# Patient Record
Sex: MALE | Race: WHITE | NOT HISPANIC OR LATINO | Employment: FULL TIME | URBAN - METROPOLITAN AREA
[De-identification: names, ages, dates, MRNs, and addresses within clinical notes are randomized per-mention and may not be internally consistent; named-entity substitution may affect disease eponyms.]

---

## 2023-04-11 ENCOUNTER — OFFICE VISIT (OUTPATIENT)
Dept: PRIMARY CARE | Facility: CLINIC | Age: 53
End: 2023-04-11
Payer: COMMERCIAL

## 2023-04-11 VITALS
SYSTOLIC BLOOD PRESSURE: 128 MMHG | OXYGEN SATURATION: 94 % | DIASTOLIC BLOOD PRESSURE: 88 MMHG | HEIGHT: 75 IN | BODY MASS INDEX: 31.83 KG/M2 | TEMPERATURE: 98.7 F | HEART RATE: 96 BPM | RESPIRATION RATE: 18 BRPM | WEIGHT: 256 LBS

## 2023-04-11 DIAGNOSIS — F17.210 CIGARETTE NICOTINE DEPENDENCE WITHOUT COMPLICATION: Primary | ICD-10-CM

## 2023-04-11 DIAGNOSIS — E66.9 CLASS 1 OBESITY WITH BODY MASS INDEX (BMI) OF 32.0 TO 32.9 IN ADULT, UNSPECIFIED OBESITY TYPE, UNSPECIFIED WHETHER SERIOUS COMORBIDITY PRESENT: ICD-10-CM

## 2023-04-11 DIAGNOSIS — G47.33 OSA (OBSTRUCTIVE SLEEP APNEA): ICD-10-CM

## 2023-04-11 DIAGNOSIS — Z12.2 ENCOUNTER FOR SCREENING FOR LUNG CANCER: ICD-10-CM

## 2023-04-11 PROCEDURE — 3008F BODY MASS INDEX DOCD: CPT | Performed by: STUDENT IN AN ORGANIZED HEALTH CARE EDUCATION/TRAINING PROGRAM

## 2023-04-11 PROCEDURE — 4004F PT TOBACCO SCREEN RCVD TLK: CPT | Performed by: STUDENT IN AN ORGANIZED HEALTH CARE EDUCATION/TRAINING PROGRAM

## 2023-04-11 PROCEDURE — 99204 OFFICE O/P NEW MOD 45 MIN: CPT | Performed by: STUDENT IN AN ORGANIZED HEALTH CARE EDUCATION/TRAINING PROGRAM

## 2023-04-11 RX ORDER — IBUPROFEN 200 MG
1 TABLET ORAL EVERY 24 HOURS
Qty: 28 PATCH | Refills: 1 | Status: SHIPPED | OUTPATIENT
Start: 2023-04-11 | End: 2023-06-06

## 2023-04-11 ASSESSMENT — ENCOUNTER SYMPTOMS
OCCASIONAL FEELINGS OF UNSTEADINESS: 0
AGITATION: 0
SPEECH DIFFICULTY: 0
TROUBLE SWALLOWING: 0
NUMBNESS: 0
NECK STIFFNESS: 0
CONFUSION: 0
WHEEZING: 0
WEAKNESS: 0
DEPRESSION: 0
DIZZINESS: 0
SHORTNESS OF BREATH: 0
LIGHT-HEADEDNESS: 0
FEVER: 0
UNEXPECTED WEIGHT CHANGE: 0
BRUISES/BLEEDS EASILY: 0
HEADACHES: 0
ADENOPATHY: 0
COUGH: 0
FATIGUE: 1
NECK PAIN: 0
CHILLS: 0
DIAPHORESIS: 0
PALPITATIONS: 0
LOSS OF SENSATION IN FEET: 0
COLOR CHANGE: 0

## 2023-04-11 NOTE — PROGRESS NOTES
Subjective   Patient ID: Derrick Duncan is a 52 y.o. male who presents for Sleep Apnea (Pt here to request a referral).    Patient presenting for concern for sleep apnea.  He has not been to his PCP in several years.  States he was previously diagnosed with CHRIS and prescribed CPAP which he used regularly and did have improvement on this device.  States he lost significant amount of weight and CHRIS improved, therefore he stopped using CPAP a few years ago.  States he began gaining weight again, has had difficulty staying asleep, and occasionally wakes up feeling short of breath.  Patient requesting new sleep study and prescription for CPAP if appropriate.  States he has not exercising regularly or following any specific dietary regimen.  Patient does smoke cigarettes daily, roughly 1 pack daily with roughly 25-pack-year history.  He is interested in quitting and states nicotine patches helped in the past and he was successful in quitting smoking for roughly 5 years up until roughly 5 years ago. Denies any excessive alcohol use or recreational drug use. Patient states he does not take any medications regularly.         Review of Systems   Constitutional:  Positive for fatigue. Negative for chills, diaphoresis, fever and unexpected weight change.   HENT:  Negative for trouble swallowing.    Eyes:  Negative for visual disturbance.   Respiratory:  Negative for cough, shortness of breath and wheezing.    Cardiovascular:  Negative for chest pain, palpitations and leg swelling.   Musculoskeletal:  Negative for neck pain and neck stiffness.   Skin:  Negative for color change and rash.   Allergic/Immunologic: Negative for environmental allergies, food allergies and immunocompromised state.   Neurological:  Negative for dizziness, speech difficulty, weakness, light-headedness, numbness and headaches.   Hematological:  Negative for adenopathy. Does not bruise/bleed easily.   Psychiatric/Behavioral:  Negative for agitation,  "behavioral problems and confusion.    All other systems reviewed and are negative.      Objective   /88 (BP Location: Right arm, Patient Position: Sitting)   Pulse 96   Temp 37.1 °C (98.7 °F) (Temporal)   Resp 18   Ht 1.905 m (6' 3\")   Wt 116 kg (256 lb)   SpO2 94%   BMI 32.00 kg/m²     Physical Exam  Vitals and nursing note reviewed.   Constitutional:       General: He is not in acute distress.     Appearance: Normal appearance. He is obese. He is not toxic-appearing.   HENT:      Head: Normocephalic and atraumatic.      Right Ear: External ear normal.      Left Ear: External ear normal.      Nose: Nose normal.      Mouth/Throat:      Mouth: Mucous membranes are moist.      Pharynx: Oropharynx is clear.      Comments: Mallampati score 3.  Eyes:      Extraocular Movements: Extraocular movements intact.      Conjunctiva/sclera: Conjunctivae normal.      Pupils: Pupils are equal, round, and reactive to light.   Cardiovascular:      Rate and Rhythm: Normal rate and regular rhythm.      Pulses: Normal pulses.      Heart sounds: Normal heart sounds. No murmur heard.  Pulmonary:      Effort: Pulmonary effort is normal. No respiratory distress.      Breath sounds: Normal breath sounds. No stridor. No wheezing or rhonchi.   Abdominal:      General: Abdomen is flat. There is no distension.      Palpations: Abdomen is soft.   Musculoskeletal:         General: Normal range of motion.      Cervical back: Normal range of motion and neck supple. No tenderness.      Right lower leg: No edema.      Left lower leg: No edema.   Lymphadenopathy:      Cervical: No cervical adenopathy.   Skin:     General: Skin is warm and dry.      Capillary Refill: Capillary refill takes less than 2 seconds.      Coloration: Skin is not jaundiced.      Findings: No rash.   Neurological:      General: No focal deficit present.      Mental Status: He is alert and oriented to person, place, and time.      Cranial Nerves: No cranial nerve " deficit.      Sensory: No sensory deficit.      Motor: No weakness.   Psychiatric:         Mood and Affect: Mood normal.         Behavior: Behavior normal.         Thought Content: Thought content normal.         Judgment: Judgment normal.         Assessment/Plan   Problem List Items Addressed This Visit    None  Visit Diagnoses       Cigarette nicotine dependence without complication    -  Primary    Relevant Medications    nicotine (Nicoderm CQ) 14 mg/24 hr patch    Other Relevant Orders    CT lung screening low dose    CHRIS (obstructive sleep apnea)        Relevant Orders    In-Center Sleep Study (Non-Sleep Provider Only)    Class 1 obesity with body mass index (BMI) of 32.0 to 32.9 in adult, unspecified obesity type, unspecified whether serious comorbidity present        Encounter for screening for lung cancer              Given history of CHRIS, diagnosis of obesity, and symptoms, will order sleep study and follow-up when resulted.  Blood pressure well controlled at this time.  Discussed importance of healthy dietary habits including reduced salt intake and reduced carbohydrates and trans/saturated fat.  Also discussed importance of regular cardiovascular exercise and staying well-hydrated.  Patient does have 25-pack-year history with cigarettes, and is interested in quitting.  States nicotine patches helped in the past.  Will prescribe daily nicotine patch at this time.  Given smoking history and age, will also order low-dose CT for lung cancer screening.  We will follow-up when resulted.  Plan for follow-up in 1 month for annual health maintenance exam.  Patient may follow-up sooner as needed.

## 2023-04-12 NOTE — PROGRESS NOTES
I saw and evaluated the patient. I personally obtained the key and critical portions of the history and physical exam or was physically present for key and critical portions performed by the resident/fellow. I reviewed the resident/fellow's documentation and discussed the patient with the resident/fellow. I agree with the resident/fellow's medical decision making as documented in the note.    Ehsan Cullen, DO

## 2023-04-20 DIAGNOSIS — G47.33 OSA (OBSTRUCTIVE SLEEP APNEA): Primary | ICD-10-CM

## 2023-04-20 DIAGNOSIS — E04.1 THYROID NODULE: ICD-10-CM

## 2023-04-24 ENCOUNTER — TELEPHONE (OUTPATIENT)
Dept: PRIMARY CARE | Facility: CLINIC | Age: 53
End: 2023-04-24
Payer: COMMERCIAL

## 2023-04-24 NOTE — TELEPHONE ENCOUNTER
Called patient to discuss sleep study results. CPAP ordered based on results. Will await return call.

## 2023-04-25 ENCOUNTER — TELEPHONE (OUTPATIENT)
Dept: PRIMARY CARE | Facility: CLINIC | Age: 53
End: 2023-04-25
Payer: COMMERCIAL

## 2023-04-27 NOTE — TELEPHONE ENCOUNTER
Called and spoke with patient regarding his results. Thyroid US and TSH w/ reflex T4 ordered. Will follow-up when results available. Regarding CHRIS, patient also was unable to get CPAP through [facility], therefore CPAP sent to [facility]. Discussed this with patient and contact info for [facility] provided.

## 2023-05-09 ENCOUNTER — TELEPHONE (OUTPATIENT)
Dept: PRIMARY CARE | Facility: CLINIC | Age: 53
End: 2023-05-09
Payer: COMMERCIAL

## 2023-05-19 ENCOUNTER — TELEPHONE (OUTPATIENT)
Dept: PRIMARY CARE | Facility: CLINIC | Age: 53
End: 2023-05-19
Payer: COMMERCIAL

## 2023-05-19 DIAGNOSIS — E04.1 THYROID NODULE: Primary | ICD-10-CM

## 2023-05-19 NOTE — TELEPHONE ENCOUNTER
Contacted patient regarding results of US thyroid. Will plan for patient to follow-up with endocrinology to discuss possible FNA. Will plan to follow-up with patient after he has been seen by endocrinology. Discussed plan with patient and he is understanding and agreeable.

## 2023-08-24 LAB
ALBUMIN (G/DL) IN SER/PLAS: 4.4 G/DL (ref 3.4–5)
ANION GAP IN SER/PLAS: 14 MMOL/L (ref 10–20)
CALCIUM (MG/DL) IN SER/PLAS: 9.7 MG/DL (ref 8.6–10.6)
CARBON DIOXIDE, TOTAL (MMOL/L) IN SER/PLAS: 24 MMOL/L (ref 21–32)
CHLORIDE (MMOL/L) IN SER/PLAS: 107 MMOL/L (ref 98–107)
CREATININE (MG/DL) IN SER/PLAS: 0.77 MG/DL (ref 0.5–1.3)
GFR MALE: >90 ML/MIN/1.73M2
GLUCOSE (MG/DL) IN SER/PLAS: 90 MG/DL (ref 74–99)
PHOSPHATE (MG/DL) IN SER/PLAS: 3.6 MG/DL (ref 2.5–4.9)
POTASSIUM (MMOL/L) IN SER/PLAS: 4.6 MMOL/L (ref 3.5–5.3)
SODIUM (MMOL/L) IN SER/PLAS: 140 MMOL/L (ref 136–145)
UREA NITROGEN (MG/DL) IN SER/PLAS: 15 MG/DL (ref 6–23)

## 2023-08-25 LAB
CALCIDIOL (25 OH VITAMIN D3) (NG/ML) IN SER/PLAS: 50 NG/ML
THYROTROPIN (MIU/L) IN SER/PLAS BY DETECTION LIMIT <= 0.05 MIU/L: 0.63 MIU/L (ref 0.44–3.98)
THYROXINE (T4) FREE (NG/DL) IN SER/PLAS: 1.06 NG/DL (ref 0.78–1.48)

## 2023-08-28 LAB
THYROGLOBULIN AB (IU/ML) IN SER/PLAS: 1.1 IU/ML (ref 0–4)
THYROGLOBULIN LC-MS/MS: NORMAL NG/ML (ref 1.3–31.8)
THYROGLOBULIN: 20.4 NG/ML (ref 1.3–31.8)

## 2023-09-13 ENCOUNTER — HOSPITAL ENCOUNTER (OUTPATIENT)
Dept: DATA CONVERSION | Facility: HOSPITAL | Age: 53
End: 2023-09-13
Attending: RADIOLOGY
Payer: COMMERCIAL

## 2023-09-13 DIAGNOSIS — E04.1 NONTOXIC SINGLE THYROID NODULE: ICD-10-CM

## 2023-09-13 LAB
ALANINE AMINOTRANSFERASE (SGPT) (U/L) IN SER/PLAS: 21 U/L (ref 10–52)
ALBUMIN (G/DL) IN SER/PLAS: 4.3 G/DL (ref 3.4–5)
ALKALINE PHOSPHATASE (U/L) IN SER/PLAS: 60 U/L (ref 33–120)
ANION GAP IN SER/PLAS: 11 MMOL/L (ref 10–20)
ASPARTATE AMINOTRANSFERASE (SGOT) (U/L) IN SER/PLAS: 15 U/L (ref 9–39)
BASOPHILS (10*3/UL) IN BLOOD BY AUTOMATED COUNT: 0.06 X10E9/L (ref 0–0.1)
BASOPHILS/100 LEUKOCYTES IN BLOOD BY AUTOMATED COUNT: 0.9 % (ref 0–2)
BILIRUBIN TOTAL (MG/DL) IN SER/PLAS: 0.4 MG/DL (ref 0–1.2)
CALCIUM (MG/DL) IN SER/PLAS: 9.5 MG/DL (ref 8.6–10.3)
CARBON DIOXIDE, TOTAL (MMOL/L) IN SER/PLAS: 28 MMOL/L (ref 21–32)
CHLORIDE (MMOL/L) IN SER/PLAS: 103 MMOL/L (ref 98–107)
CHOLESTEROL (MG/DL) IN SER/PLAS: 188 MG/DL (ref 0–199)
CHOLESTEROL IN HDL (MG/DL) IN SER/PLAS: 42.1 MG/DL
CHOLESTEROL/HDL RATIO: 4.5
CREATININE (MG/DL) IN SER/PLAS: 0.87 MG/DL (ref 0.5–1.3)
EOSINOPHILS (10*3/UL) IN BLOOD BY AUTOMATED COUNT: 0.35 X10E9/L (ref 0–0.7)
EOSINOPHILS/100 LEUKOCYTES IN BLOOD BY AUTOMATED COUNT: 5.4 % (ref 0–6)
ERYTHROCYTE DISTRIBUTION WIDTH (RATIO) BY AUTOMATED COUNT: 12.8 % (ref 11.5–14.5)
ERYTHROCYTE MEAN CORPUSCULAR HEMOGLOBIN CONCENTRATION (G/DL) BY AUTOMATED: 32.8 G/DL (ref 32–36)
ERYTHROCYTE MEAN CORPUSCULAR VOLUME (FL) BY AUTOMATED COUNT: 86 FL (ref 80–100)
ERYTHROCYTES (10*6/UL) IN BLOOD BY AUTOMATED COUNT: 4.79 X10E12/L (ref 4.5–5.9)
ESTIMATED AVERAGE GLUCOSE FOR HBA1C: 120 MG/DL
GFR MALE: >90 ML/MIN/1.73M2
GLUCOSE (MG/DL) IN SER/PLAS: 89 MG/DL (ref 74–99)
HEMATOCRIT (%) IN BLOOD BY AUTOMATED COUNT: 41.2 % (ref 41–52)
HEMOGLOBIN (G/DL) IN BLOOD: 13.5 G/DL (ref 13.5–17.5)
HEMOGLOBIN A1C/HEMOGLOBIN TOTAL IN BLOOD: 5.8 %
IMMATURE GRANULOCYTES/100 LEUKOCYTES IN BLOOD BY AUTOMATED COUNT: 0.5 % (ref 0–0.9)
LDL: 128 MG/DL (ref 0–99)
LEUKOCYTES (10*3/UL) IN BLOOD BY AUTOMATED COUNT: 6.4 X10E9/L (ref 4.4–11.3)
LYMPHOCYTES (10*3/UL) IN BLOOD BY AUTOMATED COUNT: 2.25 X10E9/L (ref 1.2–4.8)
LYMPHOCYTES/100 LEUKOCYTES IN BLOOD BY AUTOMATED COUNT: 35 % (ref 13–44)
MONOCYTES (10*3/UL) IN BLOOD BY AUTOMATED COUNT: 0.51 X10E9/L (ref 0.1–1)
MONOCYTES/100 LEUKOCYTES IN BLOOD BY AUTOMATED COUNT: 7.9 % (ref 2–10)
NEUTROPHILS (10*3/UL) IN BLOOD BY AUTOMATED COUNT: 3.23 X10E9/L (ref 1.2–7.7)
NEUTROPHILS/100 LEUKOCYTES IN BLOOD BY AUTOMATED COUNT: 50.3 % (ref 40–80)
NRBC (PER 100 WBCS) BY AUTOMATED COUNT: 0 /100 WBC (ref 0–0)
PLATELETS (10*3/UL) IN BLOOD AUTOMATED COUNT: 269 X10E9/L (ref 150–450)
POTASSIUM (MMOL/L) IN SER/PLAS: 4 MMOL/L (ref 3.5–5.3)
PROTEIN TOTAL: 7.2 G/DL (ref 6.4–8.2)
SODIUM (MMOL/L) IN SER/PLAS: 138 MMOL/L (ref 136–145)
TRIGLYCERIDE (MG/DL) IN SER/PLAS: 91 MG/DL (ref 0–149)
UREA NITROGEN (MG/DL) IN SER/PLAS: 15 MG/DL (ref 6–23)
VLDL: 18 MG/DL (ref 0–40)

## 2023-09-14 LAB
COMPLETE PATHOLOGY REPORT: NORMAL
CONVERTED CLINICAL DIAGNOSIS-HISTORY: NORMAL
CONVERTED DIAGNOSIS COMMENT: NORMAL
CONVERTED FINAL DIAGNOSIS: NORMAL
CONVERTED FINAL REPORT PDF LINK TO COPY AND PASTE: NORMAL
CONVERTED SPECIMEN DESCRIPTION: NORMAL

## 2023-10-03 ENCOUNTER — OFFICE VISIT (OUTPATIENT)
Dept: PRIMARY CARE | Facility: CLINIC | Age: 53
End: 2023-10-03
Payer: COMMERCIAL

## 2023-10-03 VITALS
WEIGHT: 254 LBS | TEMPERATURE: 98.7 F | SYSTOLIC BLOOD PRESSURE: 150 MMHG | HEART RATE: 86 BPM | OXYGEN SATURATION: 96 % | RESPIRATION RATE: 20 BRPM | HEIGHT: 75 IN | BODY MASS INDEX: 31.58 KG/M2 | DIASTOLIC BLOOD PRESSURE: 91 MMHG

## 2023-10-03 DIAGNOSIS — E07.9 THYROID LESION: ICD-10-CM

## 2023-10-03 DIAGNOSIS — N28.89 RENAL MASS: Primary | ICD-10-CM

## 2023-10-03 PROCEDURE — 4004F PT TOBACCO SCREEN RCVD TLK: CPT

## 2023-10-03 PROCEDURE — 3008F BODY MASS INDEX DOCD: CPT

## 2023-10-03 PROCEDURE — 99214 OFFICE O/P EST MOD 30 MIN: CPT

## 2023-10-03 RX ORDER — DULOXETIN HYDROCHLORIDE 60 MG/1
60 CAPSULE, DELAYED RELEASE ORAL DAILY
COMMUNITY
Start: 2023-09-25

## 2023-10-03 RX ORDER — ARIPIPRAZOLE 10 MG/1
10 TABLET ORAL DAILY
COMMUNITY
Start: 2023-09-25

## 2023-10-03 ASSESSMENT — ENCOUNTER SYMPTOMS
VOMITING: 0
NAUSEA: 0
LIGHT-HEADEDNESS: 0
FEVER: 0
SHORTNESS OF BREATH: 0
DIZZINESS: 0

## 2023-10-03 NOTE — ASSESSMENT & PLAN NOTE
Renal mass identified on CTPE in ER. Pt remains asymptomatic and unknown if familial history. Pt to undergo MRI with/without contrast for further exploration of renal lesion. Pt will be updated with results.

## 2023-10-03 NOTE — PROGRESS NOTES
Subjective   Derrick Duncan is a 52 y.o. male who presents for Follow-up ([facility] ER follow up. Review CT scan. He was told her has a mass on his lung and needs further testing.).  Pt presenting for hospital follow up for palpitations. Workup in ER was unremarkable for an acute cause and he believes it was due to his anxiety. He was anxious at the time waiting for his thyroid ultrasound w/ biopsy results. He has not had palpitations since and his results came back for a goiter. 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 No symptoms at this time.    BP repeat at 144/90, pt will call back with home readings in one week    Claims father had something for kidneys but he can't remember what        Review of Systems   Constitutional:  Negative for fever.   Respiratory:  Negative for shortness of breath.    Cardiovascular:  Negative for chest pain.   Gastrointestinal:  Negative for nausea and vomiting.   Neurological:  Negative for dizziness and light-headedness.   All other systems reviewed and are negative.      Objective   Physical Exam  Vitals reviewed.   Constitutional:       General: He is not in acute distress.     Appearance: Normal appearance. He is not toxic-appearing.   HENT:      Head: Normocephalic and atraumatic.      Nose: Nose normal.   Eyes:      Extraocular Movements: Extraocular movements intact.   Cardiovascular:      Rate and Rhythm: Normal rate and regular rhythm.      Heart sounds: No murmur heard.     No friction rub. No gallop.   Pulmonary:      Effort: Pulmonary effort is normal. No respiratory distress.      Breath sounds: Normal breath sounds. No wheezing, rhonchi or rales.   Genitourinary:     Comments: Das's punch neg BL  Skin:     General: Skin is warm and dry.   Neurological:      General: No focal deficit present.      Mental Status: He is alert.   Psychiatric:         Mood and Affect: Mood normal.          Behavior: Behavior normal.         Assessment/Plan   Problem List Items Addressed This Visit             ICD-10-CM    Renal mass - Primary N28.89     Renal mass identified on CTPE in ER. Pt remains asymptomatic and unknown if familial history. Pt to undergo MRI with/without contrast for further exploration of renal lesion. Pt will be updated with results.         Relevant Orders    MR abdomen w and wo IV contrast    Thyroid lesion E07.9     Pt has thyroid lesion found on previous imaging and confirmed with ultrasound FNA. Pt claims he was given news of it being benign, pt to follow up with endocrinology for further management. Previous TSH WNL.         Relevant Orders    Referral to Endocrinology

## 2023-10-03 NOTE — ASSESSMENT & PLAN NOTE
Pt has thyroid lesion found on previous imaging and confirmed with ultrasound FNA. Pt claims he was given news of it being benign, pt to follow up with endocrinology for further management. Previous TSH WNL.

## 2023-10-04 NOTE — PROGRESS NOTES
I reviewed with the resident the medical history and the resident’s findings on physical examination.  I discussed with the resident the patient’s diagnosis and concur with the treatment plan as documented in the resident note.     Ehsan Cullen, DO

## 2023-10-30 ENCOUNTER — HOSPITAL ENCOUNTER (OUTPATIENT)
Dept: RADIOLOGY | Facility: HOSPITAL | Age: 53
Discharge: HOME | End: 2023-10-30
Payer: COMMERCIAL

## 2023-10-30 DIAGNOSIS — N28.89 RENAL MASS: ICD-10-CM

## 2023-10-30 PROCEDURE — 74183 MRI ABD W/O CNTR FLWD CNTR: CPT

## 2023-10-30 PROCEDURE — 74183 MRI ABD W/O CNTR FLWD CNTR: CPT | Performed by: RADIOLOGY

## 2023-10-30 PROCEDURE — 2550000001 HC RX 255 CONTRASTS

## 2023-10-30 PROCEDURE — A9575 INJ GADOTERATE MEGLUMI 0.1ML: HCPCS

## 2023-10-30 RX ORDER — GADOTERATE MEGLUMINE 376.9 MG/ML
22 INJECTION INTRAVENOUS
Status: COMPLETED | OUTPATIENT
Start: 2023-10-30 | End: 2023-10-30

## 2023-10-30 RX ADMIN — GADOTERATE MEGLUMINE 22 ML: 376.9 INJECTION INTRAVENOUS at 13:50

## 2023-10-31 ENCOUNTER — TELEPHONE (OUTPATIENT)
Dept: PRIMARY CARE | Facility: CLINIC | Age: 53
End: 2023-10-31
Payer: COMMERCIAL

## 2023-10-31 NOTE — TELEPHONE ENCOUNTER
----- Message from Ehsan Cullen DO sent at 10/30/2023  4:32 PM EDT -----    ----- Message -----  From: Kelley, Radiology Results In  Sent: 10/30/2023   2:18 PM EDT  To: Ehsan Cullen DO

## 2023-10-31 NOTE — TELEPHONE ENCOUNTER
Result Communication    Resulted Orders   MR abdomen w and wo IV contrast    Narrative    Interpreted By:  Haresh Reynoso,   STUDY:  MR ABDOMEN W AND WO IV CONTRAST;  10/30/2023 1:52 pm      INDICATION:  Signs/Symptoms:kidney lesion.      COMPARISON:  None.      ACCESSION NUMBER(S):  [id number]      ORDERING CLINICIAN:  KATERINE SEGOVIA      TECHNIQUE:  MRI LIVER; Multiplanar magnetic resonance images of the abdomen were  obtained including the following sequences; T2-weighted SSFSE with  and without fat saturation, T1-weighted GRE in/opposed phase, DWI,  fat saturated 3D-T1w GRE pre and dynamically post contrast.  22 ml of  Gadolinium contrast agent Dotarem were administered intravenously  without immediate complication.      FINDINGS:  LIVER:  No definite signal changes of significant steatosis. No focal lesions  are identified.      BILE DUCTS:  No dilatation.      GALLBLADDER:  No stone or significant wall thickening.      PANCREAS:  Unremarkable. No ductal dilatation. No pancreas divisum.      SPLEEN:  Unremarkable.      ADRENAL GLANDS:  Unremarkable.      KIDNEYS:  25 mm markedly T1 hyperintense cyst without enhancement in the  posterior upper left kidney. A few small simple cysts in the left  lower pole also noted. A few additional simple cysts of the left  kidney are also noted. Otherwise unremarkable kidneys without  hydronephrosis.      LYMPH NODES:  No lymphadenopathy.      ABDOMINAL VESSELS:  Abdominal aorta is patent without aneurysm. Major visceral arterial  branches are patent. IVC and visualized major branches are patent.  Major portal venous branches are patent.      BOWEL:  No dilated bowel is visualized.      PERITONEUM/RETROPERITONEUM:  No visualized free fluid.      BONES AND LOWER THORAX:  Lumbar spine degenerative changes. Grossly clear lung bases.        Impression    25 mm hemorrhagic nonenhancing cyst in the left kidney is considered  benign (Bosniak II). A few additional smaller left renal  cysts are  simple (Bosniak I). No suspicious renal lesions.      MACRO:  None      Signed by: Haresh Reynoso 10/30/2023 2:17 PM  Dictation workstation:   [id number]       11:10 AM      Results were successfully communicated with the patient and they acknowledged their understanding.

## 2023-11-20 ENCOUNTER — TELEPHONE (OUTPATIENT)
Dept: PRIMARY CARE | Facility: HOSPITAL | Age: 53
End: 2023-11-20
Payer: COMMERCIAL

## 2023-11-20 NOTE — TELEPHONE ENCOUNTER
Patient called in needing a letter sent his job office. Patient needs to forward you an email. Please advise? Thank you!

## 2023-11-30 ENCOUNTER — OFFICE VISIT (OUTPATIENT)
Dept: PRIMARY CARE | Facility: CLINIC | Age: 53
End: 2023-11-30
Payer: COMMERCIAL

## 2023-11-30 VITALS
BODY MASS INDEX: 32 KG/M2 | DIASTOLIC BLOOD PRESSURE: 84 MMHG | WEIGHT: 256 LBS | TEMPERATURE: 98.2 F | RESPIRATION RATE: 18 BRPM | SYSTOLIC BLOOD PRESSURE: 124 MMHG | HEART RATE: 80 BPM | OXYGEN SATURATION: 96 %

## 2023-11-30 DIAGNOSIS — M54.10 RADICULAR LOW BACK PAIN: Primary | ICD-10-CM

## 2023-11-30 PROCEDURE — 3008F BODY MASS INDEX DOCD: CPT

## 2023-11-30 PROCEDURE — 99213 OFFICE O/P EST LOW 20 MIN: CPT

## 2023-11-30 PROCEDURE — 4004F PT TOBACCO SCREEN RCVD TLK: CPT

## 2023-11-30 RX ORDER — MELOXICAM 15 MG/1
15 TABLET ORAL DAILY
Qty: 30 TABLET | Refills: 11 | Status: SHIPPED | OUTPATIENT
Start: 2023-11-30 | End: 2024-11-29

## 2023-11-30 ASSESSMENT — ENCOUNTER SYMPTOMS
EYE ITCHING: 0
LIGHT-HEADEDNESS: 0
FEVER: 0
STRIDOR: 0
ARTHRALGIAS: 1
NAUSEA: 0
CHEST TIGHTNESS: 0
MYALGIAS: 0
EYE PAIN: 0
NUMBNESS: 0
WOUND: 0
POLYPHAGIA: 0
POLYDIPSIA: 0
HEADACHES: 0
SHORTNESS OF BREATH: 0
EYE DISCHARGE: 0
DIZZINESS: 0
EYE REDNESS: 0
ACTIVITY CHANGE: 0
SEIZURES: 0
ABDOMINAL PAIN: 0
VOMITING: 0
FATIGUE: 0
COUGH: 0
ABDOMINAL DISTENTION: 0
WHEEZING: 0
AGITATION: 0
SORE THROAT: 0
RHINORRHEA: 0
SINUS PRESSURE: 0
CHOKING: 0
FREQUENCY: 0
DIARRHEA: 0
PALPITATIONS: 0
DIFFICULTY URINATING: 0
SLEEP DISTURBANCE: 0
APPETITE CHANGE: 0
SINUS PAIN: 0
DYSURIA: 0
CONSTIPATION: 0
FACIAL ASYMMETRY: 0
CHILLS: 0

## 2023-11-30 NOTE — PATIENT INSTRUCTIONS
-Try Meloxicam once daily for a month. Don't take Advil with this.   -Start doing the exercises daily (keep doing these when you feel better)  - Get X-rays done   - Follow up in a month if not improving

## 2023-11-30 NOTE — PROGRESS NOTES
Subjective   Patient ID: [id number] 1970   Derrick Duncan is a 53 y.o. male who presents for Leg Pain (left).  Patient reports chronic intermittent left leg pain for the past 8 months.  He describes a stabbing/shooting, tingling pain extending from his left buttock down the posterior aspect of his left leg to the level of the ankle.  He also has some pain extending into the left groin.  He says this is typically 4/10 at baseline, sometimes worsening to 6/10.   It is usually exacerbated by movement and by laying down in bed at night.  He states that he has started sleeping on the couch, sitting up with his legs extended in front of him. He does have a history of chronic low back pain, but states that this has not been flared up recently.  He has successfully treated low back pain in the past with physical therapy.  He has been seeing a chiropractor once weekly.  He states that this resolves his symptoms for a day or 2 but this relief never lasts long.  Patient is also been taking ibuprofen 200 mg twice daily about 5 days a week.  He works as a . Pt denies any red flag symptoms such as fever, saddle anesthesia, loss of bowel/bladder control.  He is a current smoker, trying to quit.  He has no history of IV drug use.              Review of Systems   Constitutional:  Negative for activity change, appetite change, chills, fatigue and fever.   HENT:  Negative for congestion, dental problem, ear pain, mouth sores, postnasal drip, rhinorrhea, sinus pressure, sinus pain, sneezing and sore throat.    Eyes:  Negative for pain, discharge, redness and itching.   Respiratory:  Negative for cough, choking, chest tightness, shortness of breath, wheezing and stridor.    Cardiovascular:  Negative for chest pain, palpitations and leg swelling.   Gastrointestinal:  Negative for abdominal distention, abdominal pain, constipation, diarrhea, nausea and vomiting.   Endocrine: Negative for polydipsia, polyphagia and  polyuria.   Genitourinary:  Negative for decreased urine volume, difficulty urinating, dysuria, enuresis, frequency and urgency.   Musculoskeletal:  Positive for arthralgias. Negative for myalgias.        Radicular pain to the left leg   Skin:  Negative for pallor, rash and wound.   Neurological:  Negative for dizziness, seizures, syncope, facial asymmetry, light-headedness, numbness and headaches.   Psychiatric/Behavioral:  Negative for agitation, behavioral problems, sleep disturbance and suicidal ideas.        Objective   /84 (BP Location: Left arm, Patient Position: Sitting, BP Cuff Size: Large adult)   Pulse 80   Temp 36.8 °C (98.2 °F)   Resp 18   Wt 116 kg (256 lb)   SpO2 96%   BMI 32.00 kg/m²    Physical Exam  Vitals and nursing note reviewed.   Constitutional:       General: He is not in acute distress.     Appearance: Normal appearance. He is not toxic-appearing.   HENT:      Head: Normocephalic and atraumatic.      Right Ear: Tympanic membrane, ear canal and external ear normal.      Left Ear: Tympanic membrane, ear canal and external ear normal.      Nose: Nose normal.      Mouth/Throat:      Mouth: Mucous membranes are moist.      Pharynx: No oropharyngeal exudate or posterior oropharyngeal erythema.   Eyes:      General: No scleral icterus.     Extraocular Movements: Extraocular movements intact.      Pupils: Pupils are equal, round, and reactive to light.   Cardiovascular:      Rate and Rhythm: Normal rate and regular rhythm.      Pulses: Normal pulses.      Heart sounds: Normal heart sounds. No murmur heard.     No friction rub. No gallop.   Pulmonary:      Effort: Pulmonary effort is normal. No respiratory distress.      Breath sounds: Normal breath sounds. No stridor. No wheezing, rhonchi or rales.   Abdominal:      General: Abdomen is flat. Bowel sounds are normal. There is no distension.      Palpations: Abdomen is soft. There is no mass.      Tenderness: There is no abdominal  tenderness. There is no guarding or rebound.      Hernia: No hernia is present.   Musculoskeletal:         General: No swelling, deformity or signs of injury. Normal range of motion.      Cervical back: Normal range of motion and neck supple. No rigidity.      Thoracic back: Normal.      Lumbar back: Normal. No spasms, tenderness or bony tenderness. Negative right straight leg raise test and negative left straight leg raise test. No scoliosis.      Right hip: No tenderness or bony tenderness. Normal range of motion. Normal strength.      Left hip: No tenderness or bony tenderness. Normal range of motion. Normal strength.      Right lower leg: No edema.      Left lower leg: No edema.      Comments: Bilateral hamstring tightness; negative logroll.  No pain with passive internal/external rotation of hip with 90 degrees of knee flexion; no pain with loading and range of motion of the hip joint   Lymphadenopathy:      Cervical: No cervical adenopathy.   Skin:     General: Skin is warm and dry.      Capillary Refill: Capillary refill takes less than 2 seconds.      Findings: No rash.   Neurological:      General: No focal deficit present.      Mental Status: He is alert and oriented to person, place, and time. Mental status is at baseline.      Cranial Nerves: No cranial nerve deficit.      Sensory: No sensory deficit.      Motor: No weakness.      Coordination: Coordination normal.   Psychiatric:         Mood and Affect: Mood normal.         Behavior: Behavior normal.         Assessment/Plan   Problem List Items Addressed This Visit    None  Visit Diagnoses       Radicular low back pain    -  Primary    Relevant Medications    meloxicam (Mobic) 15 mg tablet    Other Relevant Orders    XR hip left with pelvis when performed 2 or 3 views    XR lumbar spine 2-3 views             Presentation consistent with lumbar radiculopathy, although unable to reproduce on exam today.  Will obtain hip x-ray to rule out osteoarthritis.   Will also obtain lumbar x-rays as these have never been done before.  Will treat with meloxicam once daily and with daily Yung exercises which were provided to the patient.  Follow-up in 1 month, if not improving will consider referral for formal physical therapy.    Discussed with attending physician Dr. Herrera.      Aly Arteaga DO

## 2023-12-04 NOTE — PROGRESS NOTES
I reviewed the resident/fellow's documentation and discussed the patient with the resident/fellow. I agree with the resident/fellow's medical decision making as documented in the note.     Khloe Herrera MD

## 2024-08-20 ENCOUNTER — APPOINTMENT (OUTPATIENT)
Dept: ENDOCRINOLOGY | Facility: CLINIC | Age: 54
End: 2024-08-20
Payer: COMMERCIAL

## 2024-08-22 ENCOUNTER — APPOINTMENT (OUTPATIENT)
Dept: ENDOCRINOLOGY | Facility: HOSPITAL | Age: 54
End: 2024-08-22
Payer: COMMERCIAL

## 2024-10-17 ENCOUNTER — APPOINTMENT (OUTPATIENT)
Dept: ENDOCRINOLOGY | Facility: CLINIC | Age: 54
End: 2024-10-17
Payer: COMMERCIAL

## 2025-06-06 ENCOUNTER — APPOINTMENT (OUTPATIENT)
Dept: PRIMARY CARE | Facility: CLINIC | Age: 55
End: 2025-06-06
Payer: COMMERCIAL

## 2025-06-06 VITALS
TEMPERATURE: 98.1 F | WEIGHT: 252.38 LBS | SYSTOLIC BLOOD PRESSURE: 132 MMHG | DIASTOLIC BLOOD PRESSURE: 86 MMHG | RESPIRATION RATE: 16 BRPM | HEART RATE: 87 BPM | BODY MASS INDEX: 31.54 KG/M2 | OXYGEN SATURATION: 95 %

## 2025-06-06 DIAGNOSIS — Z00.00 ANNUAL PHYSICAL EXAM: ICD-10-CM

## 2025-06-06 DIAGNOSIS — G47.33 OSA (OBSTRUCTIVE SLEEP APNEA): Primary | ICD-10-CM

## 2025-06-06 ASSESSMENT — ENCOUNTER SYMPTOMS
SHORTNESS OF BREATH: 0
OCCASIONAL FEELINGS OF UNSTEADINESS: 0
VOMITING: 0
NAUSEA: 0
FEVER: 0
DEPRESSION: 0
DIZZINESS: 0
LOSS OF SENSATION IN FEET: 0
LIGHT-HEADEDNESS: 0

## 2025-06-06 ASSESSMENT — PATIENT HEALTH QUESTIONNAIRE - PHQ9
2. FEELING DOWN, DEPRESSED OR HOPELESS: NOT AT ALL
1. LITTLE INTEREST OR PLEASURE IN DOING THINGS: NOT AT ALL
SUM OF ALL RESPONSES TO PHQ9 QUESTIONS 1 & 2: 0

## 2025-06-06 NOTE — PROGRESS NOTES
Subjective   Derrick Duncan is a 54 y.o. male who presents for Sleep Apnea (Pt here today to request Rx for cpap supplies).  HPI    Medications Ordered Prior to Encounter[1]    Review of Systems   Constitutional:  Negative for fever.   Respiratory:  Negative for shortness of breath.    Cardiovascular:  Negative for chest pain.   Gastrointestinal:  Negative for nausea and vomiting.   Neurological:  Negative for dizziness and light-headedness.   All other systems reviewed and are negative.      Vitals:    06/06/25 1500   BP: 132/86   Pulse: 87   Resp: 16   Temp: 36.7 °C (98.1 °F)   SpO2: 95%     Objective   Physical Exam  Vitals reviewed.   Constitutional:       General: He is not in acute distress.     Appearance: Normal appearance. He is not toxic-appearing.   HENT:      Head: Normocephalic and atraumatic.      Nose: Nose normal.   Eyes:      Extraocular Movements: Extraocular movements intact.   Cardiovascular:      Rate and Rhythm: Normal rate and regular rhythm.      Heart sounds: No murmur heard.     No friction rub. No gallop.   Pulmonary:      Effort: Pulmonary effort is normal. No respiratory distress.      Breath sounds: Normal breath sounds. No wheezing, rhonchi or rales.   Skin:     General: Skin is warm and dry.   Neurological:      General: No focal deficit present.      Mental Status: He is alert.   Psychiatric:         Mood and Affect: Mood normal.         Behavior: Behavior normal.         Assessment & Plan  CHRIS (obstructive sleep apnea)  Pt has been using his CPAP for two years. He enjoys the sleep he is getting with it and has had major improvement in his symptoms. His machine has been flagging him because his hose is loose and is not fitting well. He does need some new tubing and wanted a prescription for it. He denies any issues other than that.   Orders:    Positive Airway Pressure (PAP) Therapy    Annual physical exam  Pt due for annual physical, will schedule in a couple weeks.   Orders:     Follow Up In Primary Care; Future              Noah Infante DO        [1]   Current Outpatient Medications on File Prior to Visit   Medication Sig Dispense Refill    ARIPiprazole (Abilify) 10 mg tablet Take 1 tablet (10 mg) by mouth once daily. (Patient taking differently: Take 0.5 tablets (5 mg) by mouth once daily.)      DULoxetine (Cymbalta) 60 mg DR capsule Take 1 capsule (60 mg) by mouth once daily.      nicotine (Nicoderm CQ) 14 mg/24 hr patch Place 1 patch over 24 hours on the skin once every 24 hours. 28 patch 1     No current facility-administered medications on file prior to visit.

## 2025-06-17 ENCOUNTER — TELEPHONE (OUTPATIENT)
Dept: PRIMARY CARE | Facility: CLINIC | Age: 55
End: 2025-06-17
Payer: COMMERCIAL

## 2025-06-19 ENCOUNTER — OFFICE VISIT (OUTPATIENT)
Dept: PRIMARY CARE | Facility: CLINIC | Age: 55
End: 2025-06-19
Payer: COMMERCIAL

## 2025-06-19 VITALS
TEMPERATURE: 98.2 F | BODY MASS INDEX: 31.31 KG/M2 | OXYGEN SATURATION: 95 % | RESPIRATION RATE: 16 BRPM | SYSTOLIC BLOOD PRESSURE: 146 MMHG | DIASTOLIC BLOOD PRESSURE: 91 MMHG | HEART RATE: 81 BPM | WEIGHT: 250.5 LBS

## 2025-06-19 DIAGNOSIS — I10 HYPERTENSION, BENIGN: ICD-10-CM

## 2025-06-19 DIAGNOSIS — Z12.11 SCREENING FOR COLON CANCER: ICD-10-CM

## 2025-06-19 DIAGNOSIS — E07.9 THYROID LESION: ICD-10-CM

## 2025-06-19 DIAGNOSIS — Z00.00 ANNUAL PHYSICAL EXAM: ICD-10-CM

## 2025-06-19 DIAGNOSIS — Z00.00 HEALTHCARE MAINTENANCE: Primary | ICD-10-CM

## 2025-06-19 DIAGNOSIS — G47.33 OSA (OBSTRUCTIVE SLEEP APNEA): ICD-10-CM

## 2025-06-19 DIAGNOSIS — Z12.5 ENCOUNTER FOR SCREENING FOR MALIGNANT NEOPLASM OF PROSTATE: Primary | ICD-10-CM

## 2025-06-19 PROCEDURE — 99213 OFFICE O/P EST LOW 20 MIN: CPT

## 2025-06-19 PROCEDURE — 3077F SYST BP >= 140 MM HG: CPT

## 2025-06-19 PROCEDURE — 3080F DIAST BP >= 90 MM HG: CPT

## 2025-06-19 ASSESSMENT — ENCOUNTER SYMPTOMS
RHINORRHEA: 0
PALPITATIONS: 0
ABDOMINAL PAIN: 0
COUGH: 0
SHORTNESS OF BREATH: 0
APPETITE CHANGE: 0
CHILLS: 0
NERVOUS/ANXIOUS: 0
DYSURIA: 0
DYSPHORIC MOOD: 0
FATIGUE: 0
CHEST TIGHTNESS: 0
WHEEZING: 0

## 2025-06-19 NOTE — LETTER
June 19, 2025     Patient: Derrick Duncan   YOB: 1970   Date of Visit: 6/19/2025       To Whom It May Concern:    It is my medical opinion that Derrick Duncan.  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 In regards to patient's thyroid nodule, this was biopsied and was shown to be benign, which means there is no current clinical concern. No medications or medical intervention needed. Patient has sleep apnea, and is compliant with CPAP. No current concerns.     If you have any questions or concerns, please don't hesitate to call.         Sincerely,        Papa Alvarado MD    CC: No Recipients

## 2025-06-19 NOTE — ASSESSMENT & PLAN NOTE
- No acute concerns regarding thyroid lesion.   - Benign FNA in 2023, stable  - Documentation provided for new employer.

## 2025-06-19 NOTE — PROGRESS NOTES
Subjective   Derrick Duncan is a 54 y.o. male who presents for Thyroid Problem (Pt here to discuss thyroid and have checked).  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 Patient is in need of documentation explaining thyroid nodule / sleep apnea to new employer. Patient has no acute concerns. Is compliant with sleep apnea treatment. Wears CPAP nightly. Patient had a FNA of the thyroid nodule in 2023 which was benign. Patient has had no other acute concerns regarding nodule.       Review of Systems   Constitutional:  Negative for appetite change, chills and fatigue.   HENT:  Negative for congestion, rhinorrhea and sneezing.    Respiratory:  Negative for cough, chest tightness, shortness of breath and wheezing.    Cardiovascular:  Negative for chest pain and palpitations.   Gastrointestinal:  Negative for abdominal pain.   Genitourinary: Negative.  Negative for dysuria.   Psychiatric/Behavioral:  Negative for dysphoric mood. The patient is not nervous/anxious.        Objective     Visit Vitals  BP (!) 146/91 (BP Location: Right arm, Patient Position: Sitting)   Pulse 81   Temp 36.8 °C (98.2 °F) (Temporal)   Resp 16      Physical Exam  Constitutional:       Appearance: Normal appearance.   HENT:      Head: Normocephalic and atraumatic.   Cardiovascular:      Rate and Rhythm: Normal rate and regular rhythm.      Heart sounds: No murmur heard.  Pulmonary:      Effort: Pulmonary effort is normal. No respiratory distress.      Breath sounds: Normal breath sounds.   Musculoskeletal:      Cervical back: Normal range of motion and neck supple.   Skin:     General: Skin is warm and dry.   Neurological:      General: No focal deficit present.      Mental Status: He is alert and oriented to person, place, and time.      Gait: Gait normal.   Psychiatric:         Mood and Affect: Mood normal.         Behavior: Behavior normal.          Assessment/Plan     Patient is a 54 year old male who presents at this time for documentation for employer regarding CHRIS & Thyroid Nodule; as patient will begin emplyement as a  for [employer].  Routine lab work was ordered this morning by Dr. Louise.  Patient is overall feeling well and has no acute concerns at this time.  Will opt for Cologuard for colon cancer screening.  Patient will follow-up in 3 months to discuss provide of medicine in more depth, recheck blood pressure given elevated today, will trend BP with home cuff in meantime.  Assessment & Plan  CHRIS (obstructive sleep apnea)  - Patient has CHRIS, is compliant with CPAP  - Feels well rested.  No acute concerns.  - Documentation provided for new employer.       Thyroid lesion  - No acute concerns regarding thyroid lesion.   - Benign FNA in 2023, stable  - Documentation provided for new employer.        Healthcare maintenance  - Routine lab work has already been ordered this morning by Dr. Louise   - Morning labs ordered include CBC, CMP, lipid panel, PSA, TSH, hepatitis C antibodies, HIV  -Patient to follow-up within 3 months to discuss preventative care in more depth.  Orders:    Follow Up In [facility] - PCP - Established; Future    Screening for colon cancer  - Cologuard ordered as well  Orders:    Cologuard® colon cancer screening; Future    Hypertension, benign  - Patient to check BP at home regularly between now and next appointment.   - Blood pressure was acutely elevated 146/91 today in office.  However was 132/86 several weeks ago.  Does not check blood pressure regularly at home              Papa Alvarado MD 06/19/25 5:30 PM       Dragon voice to text was used in the creation of this note. Please excuse any grammatical errors.

## 2025-06-27 ENCOUNTER — APPOINTMENT (OUTPATIENT)
Dept: PRIMARY CARE | Facility: CLINIC | Age: 55
End: 2025-06-27
Payer: COMMERCIAL

## 2025-07-28 ENCOUNTER — APPOINTMENT (OUTPATIENT)
Dept: ENDOCRINOLOGY | Facility: HOSPITAL | Age: 55
End: 2025-07-28
Payer: COMMERCIAL

## 2025-07-28 VITALS
HEART RATE: 93 BPM | WEIGHT: 255 LBS | SYSTOLIC BLOOD PRESSURE: 155 MMHG | BODY MASS INDEX: 31.71 KG/M2 | DIASTOLIC BLOOD PRESSURE: 96 MMHG | HEIGHT: 75 IN | TEMPERATURE: 97.2 F

## 2025-07-28 DIAGNOSIS — E07.9 THYROID LESION: Primary | ICD-10-CM

## 2025-07-28 DIAGNOSIS — E04.1 THYROID NODULE GREATER THAN OR EQUAL TO 1 CM IN DIAMETER INCIDENTALLY NOTED ON IMAGING STUDY: ICD-10-CM

## 2025-07-28 PROCEDURE — 99214 OFFICE O/P EST MOD 30 MIN: CPT | Performed by: STUDENT IN AN ORGANIZED HEALTH CARE EDUCATION/TRAINING PROGRAM

## 2025-07-28 PROCEDURE — 3008F BODY MASS INDEX DOCD: CPT | Performed by: STUDENT IN AN ORGANIZED HEALTH CARE EDUCATION/TRAINING PROGRAM

## 2025-07-28 PROCEDURE — 99214 OFFICE O/P EST MOD 30 MIN: CPT | Mod: GC | Performed by: STUDENT IN AN ORGANIZED HEALTH CARE EDUCATION/TRAINING PROGRAM

## 2025-07-28 ASSESSMENT — PATIENT HEALTH QUESTIONNAIRE - PHQ9
1. LITTLE INTEREST OR PLEASURE IN DOING THINGS: NOT AT ALL
SUM OF ALL RESPONSES TO PHQ9 QUESTIONS 1 AND 2: 0
2. FEELING DOWN, DEPRESSED OR HOPELESS: NOT AT ALL

## 2025-07-28 ASSESSMENT — PAIN SCALES - GENERAL: PAINLEVEL_OUTOF10: 0-NO PAIN

## 2025-07-28 ASSESSMENT — ENCOUNTER SYMPTOMS
LOSS OF SENSATION IN FEET: 0
DEPRESSION: 0
OCCASIONAL FEELINGS OF UNSTEADINESS: 0

## 2025-07-28 NOTE — PROGRESS NOTES
"    INTERNAL MEDICINE - Endocrine Clinic Note    Established patient visit     Derrick Duncan is 54 y.o. a male   who presents for Follow-up  Of thyroid nodule, last seen 9/2023.     HPI     The patient is here today for follow up of benign thyroid nodule, last seen at the endocrinology clinic 9/2023.     He was initially referred to endocrine clinic in 2023 for thyroid nodule, which was found during sleep apnea evaluation:  His most recent ultrasound 9/2023: 4.2 cm left lobe thyroid nodule, FNA showed benign follicular nodule, TSH, T4, thyroglobulin and antithyroglobulin antibodies were all within normal.   He was planned for an ultrasound after a year, he missed last years appointment, he is here now for follow up.   since his last appt he reported no no new acute concerns.   he denied palpitation, change in weight or appetite, change in bowel habits, chest pain, fever, night sweats, denies any compressive symptoms such as dysphagia, difficulty breathing, he is compliant on his CPAP and feels that his CHRIS is well-controlled.    Review of Systems unremarkable unless mentioned otherwise.     Health Maintenance Due   Topic Date Due    Yearly Adult Physical  Never done    HIV Screening  Never done    Colorectal Cancer Screening  Never done    MMR Vaccines (1 of 1 - Standard series) Never done    Hepatitis C Screening  Never done    Hepatitis B Vaccines (1 of 3 - 19+ 3-dose series) Never done    Pneumococcal Vaccine (1 of 1 - PCV) Never done    Lung Cancer Screening  04/25/2024    COVID-19 Vaccine (1 - 2024-25 season) Never done      RX Allergies[1]       Exam    Physical Exam   Visit Vitals  BP (!) 155/96   Pulse 93   Temp 36.2 °C (97.2 °F) (Temporal)   Ht 1.905 m (6' 3\")   Wt 116 kg (255 lb)   BMI 31.87 kg/m²   Smoking Status Former   BSA 2.48 m²      Physical Exam  Constitutional:       General: He is not in acute distress.     Appearance: He is not ill-appearing, toxic-appearing or diaphoretic.   HENT:      " Head:      Comments:  Left Neck thyroid nodule, moves with swallowing, non tender, no warmth, no skin changes. No lymphadenopathy.   Fine tremor noted at the left hand.  Normal reflexes bilateral.      Eyes:      Conjunctiva/sclera: Conjunctivae normal.       Cardiovascular:      Rate and Rhythm: Regular rhythm. Tachycardia present.      Pulses: Normal pulses.      Heart sounds: Normal heart sounds. No murmur heard.  Pulmonary:      Breath sounds: No stridor.     Musculoskeletal:         General: Normal range of motion.      Cervical back: Normal range of motion and neck supple.      Right lower leg: No edema.      Left lower leg: No edema.   Lymphadenopathy:      Cervical: No cervical adenopathy.     Neurological:      General: No focal deficit present.      Mental Status: He is alert. Mental status is at baseline.          Objective    Medications     Current Outpatient Medications   Medication Instructions    ARIPiprazole (ABILIFY) 10 mg, Daily    DULoxetine (CYMBALTA) 60 mg, Daily    nicotine (Nicoderm CQ) 14 mg/24 hr patch 1 patch, transdermal, Every 24 hours          Assessment    Assessment/Plan       54 year old male patient who is know to have a left thyroid lobe Benign follicular nodule based on FNA results in 2023, here for follow up. Notable his thyroid US in may 2023 mentioned a nodule size of 3.7 and the following US on 9/2023 mentioned it is 4.2 cm, which could be variation in measurement that is  dependant or an actual mild increase. In the past 2 years he denies having any concerning sx related related to hyper or hypofunction of the thyroid and denies having any compressive sx, his Physical exam today was only notable for mild left hand fine tremor and palpitation which he notes usually happens when he comes to medical appts.     Plan is to repeat US of thyroid, and TSH. Based on the results if there is a concern for increase in nodule size we could attempt a repeat FNA vs Surgical  referral.     Return to clinic in 1 year.     Problem List Items Addressed This Visit    None       Maria Galdamez MD  The patient was seen and discussed with the attending physician.        [1] Not on File

## 2025-07-29 LAB — TSH SERPL-ACNC: 0.53 MIU/L (ref 0.4–4.5)

## 2025-07-31 ENCOUNTER — HOSPITAL ENCOUNTER (OUTPATIENT)
Dept: RADIOLOGY | Facility: HOSPITAL | Age: 55
Discharge: HOME | End: 2025-07-31
Payer: COMMERCIAL

## 2025-07-31 PROCEDURE — 76536 US EXAM OF HEAD AND NECK: CPT

## 2025-08-01 ENCOUNTER — TELEPHONE (OUTPATIENT)
Dept: PRIMARY CARE | Facility: HOSPITAL | Age: 55
End: 2025-08-01

## 2025-09-02 ENCOUNTER — HOSPITAL ENCOUNTER (OUTPATIENT)
Dept: RADIOLOGY | Facility: HOSPITAL | Age: 55
Discharge: HOME | End: 2025-09-02
Payer: COMMERCIAL

## 2025-09-02 VITALS
HEART RATE: 89 BPM | SYSTOLIC BLOOD PRESSURE: 154 MMHG | RESPIRATION RATE: 20 BRPM | OXYGEN SATURATION: 96 % | DIASTOLIC BLOOD PRESSURE: 97 MMHG

## 2025-09-02 DIAGNOSIS — E04.1 THYROID NODULE: ICD-10-CM

## 2025-09-02 PROCEDURE — 76942 ECHO GUIDE FOR BIOPSY: CPT

## 2025-09-02 PROCEDURE — 2500000004 HC RX 250 GENERAL PHARMACY W/ HCPCS (ALT 636 FOR OP/ED): Performed by: NURSE PRACTITIONER

## 2025-09-02 PROCEDURE — 10006 FNA BX W/US GDN EA ADDL: CPT

## 2025-09-02 PROCEDURE — 10005 FNA BX W/US GDN 1ST LES: CPT

## 2025-09-02 PROCEDURE — 88173 CYTOPATH EVAL FNA REPORT: CPT | Mod: TC | Performed by: STUDENT IN AN ORGANIZED HEALTH CARE EDUCATION/TRAINING PROGRAM

## 2025-09-02 RX ORDER — LIDOCAINE HYDROCHLORIDE 10 MG/ML
INJECTION, SOLUTION EPIDURAL; INFILTRATION; INTRACAUDAL; PERINEURAL
Status: COMPLETED | OUTPATIENT
Start: 2025-09-02 | End: 2025-09-02

## 2025-09-02 RX ADMIN — LIDOCAINE HYDROCHLORIDE 2 ML: 10 INJECTION, SOLUTION EPIDURAL; INFILTRATION; INTRACAUDAL; PERINEURAL at 11:16

## 2025-09-02 ASSESSMENT — PAIN SCALES - GENERAL
PAINLEVEL_OUTOF10: 0 - NO PAIN

## 2025-09-03 LAB
LABORATORY COMMENT REPORT: NORMAL
LABORATORY COMMENT REPORT: NORMAL
PATH REPORT.COMMENTS IMP SPEC: NORMAL
PATH REPORT.FINAL DX SPEC: NORMAL
PATH REPORT.GROSS SPEC: NORMAL
PATH REPORT.RELEVANT HX SPEC: NORMAL
PATH REPORT.TOTAL CANCER: NORMAL

## 2025-12-04 ENCOUNTER — APPOINTMENT (OUTPATIENT)
Facility: CLINIC | Age: 55
End: 2025-12-04
Payer: COMMERCIAL